# Patient Record
Sex: FEMALE | Race: BLACK OR AFRICAN AMERICAN | NOT HISPANIC OR LATINO | Employment: UNEMPLOYED | URBAN - METROPOLITAN AREA
[De-identification: names, ages, dates, MRNs, and addresses within clinical notes are randomized per-mention and may not be internally consistent; named-entity substitution may affect disease eponyms.]

---

## 2019-12-02 ENCOUNTER — OFFICE VISIT (OUTPATIENT)
Dept: OBSTETRICS AND GYNECOLOGY | Facility: CLINIC | Age: 59
End: 2019-12-02
Payer: OTHER GOVERNMENT

## 2019-12-02 ENCOUNTER — TELEPHONE (OUTPATIENT)
Dept: OBSTETRICS AND GYNECOLOGY | Facility: CLINIC | Age: 59
End: 2019-12-02

## 2019-12-02 VITALS — WEIGHT: 141.75 LBS | DIASTOLIC BLOOD PRESSURE: 68 MMHG | SYSTOLIC BLOOD PRESSURE: 108 MMHG

## 2019-12-02 DIAGNOSIS — Z01.419 ENCOUNTER FOR WELL WOMAN EXAM WITH ROUTINE GYNECOLOGICAL EXAM: Primary | ICD-10-CM

## 2019-12-02 PROCEDURE — 88175 CYTOPATH C/V AUTO FLUID REDO: CPT

## 2019-12-02 PROCEDURE — 99999 PR PBB SHADOW E&M-NEW PATIENT-LVL II: ICD-10-PCS | Mod: PBBFAC,,, | Performed by: OBSTETRICS & GYNECOLOGY

## 2019-12-02 PROCEDURE — 99999 PR PBB SHADOW E&M-NEW PATIENT-LVL II: CPT | Mod: PBBFAC,,, | Performed by: OBSTETRICS & GYNECOLOGY

## 2019-12-02 PROCEDURE — 99386 PREV VISIT NEW AGE 40-64: CPT | Mod: S$PBB,,, | Performed by: OBSTETRICS & GYNECOLOGY

## 2019-12-02 PROCEDURE — 87624 HPV HI-RISK TYP POOLED RSLT: CPT

## 2019-12-02 PROCEDURE — 99202 OFFICE O/P NEW SF 15 MIN: CPT | Mod: PBBFAC | Performed by: OBSTETRICS & GYNECOLOGY

## 2019-12-02 PROCEDURE — 99386 PR PREVENTIVE VISIT,NEW,40-64: ICD-10-PCS | Mod: S$PBB,,, | Performed by: OBSTETRICS & GYNECOLOGY
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
## 2019-12-02 NOTE — TELEPHONE ENCOUNTER
Attempted to contact patient , no answer. Left detailed message on patient voicemail to return call to the office.

## 2019-12-02 NOTE — TELEPHONE ENCOUNTER
----- Message from Shazia Khan sent at 12/2/2019 12:36 PM CST -----  Contact: Patient  Patient believe she will be more than 15 minutes late for her appointment. Please call to advise if she will be seen at Ph .[phone]

## 2019-12-06 LAB
HPV HR 12 DNA SPEC QL NAA+PROBE: NEGATIVE
HPV16 AG SPEC QL: NEGATIVE
HPV18 DNA SPEC QL NAA+PROBE: NEGATIVE

## 2019-12-15 LAB
FINAL PATHOLOGIC DIAGNOSIS: NORMAL
Lab: NORMAL

## 2019-12-17 ENCOUNTER — TELEPHONE (OUTPATIENT)
Dept: OBSTETRICS AND GYNECOLOGY | Facility: CLINIC | Age: 59
End: 2019-12-17

## 2019-12-17 NOTE — TELEPHONE ENCOUNTER
----- Message from Niyah Gallego MD sent at 12/16/2019  8:26 AM CST -----  Patient's pap smear is normal. She is not on mychart.  Please let her know.

## 2019-12-18 ENCOUNTER — TELEPHONE (OUTPATIENT)
Dept: ENDOSCOPY | Facility: HOSPITAL | Age: 59
End: 2019-12-18

## 2019-12-18 ENCOUNTER — TELEPHONE (OUTPATIENT)
Dept: OBSTETRICS AND GYNECOLOGY | Facility: CLINIC | Age: 59
End: 2019-12-18

## 2019-12-18 NOTE — TELEPHONE ENCOUNTER
----- Message from Eryn Valdes sent at 12/18/2019 11:44 AM CST -----  Contact: Pt   Pt is calling regarding requesting to have nurse call to back. Pt states that call is concerning  getting appt schedule . .[phone] (home)        .Thank You  Eryn Valdes

## 2019-12-18 NOTE — TELEPHONE ENCOUNTER
----- Message from Nadine Gudino sent at 12/18/2019 11:13 AM CST -----  Contact: [facility] includes the [facility]   Patient returning call.Please call back a [phone].    Thanks,  Nadine Gudino

## 2019-12-18 NOTE — TELEPHONE ENCOUNTER
Spoke to pt regarding her pap smear is normal and to follow up in 1 year. Pt. verbalized understanding.

## 2020-01-02 ENCOUNTER — TELEPHONE (OUTPATIENT)
Dept: OBSTETRICS AND GYNECOLOGY | Facility: CLINIC | Age: 60
End: 2020-01-02

## 2020-01-02 NOTE — TELEPHONE ENCOUNTER
Patient was calling to get an order put in to have a colonoscopy done, will forward message to Corinna to put in order.

## 2020-01-02 NOTE — TELEPHONE ENCOUNTER
----- Message from Yair Cornejo sent at 1/2/2020  8:14 AM CST -----  Contact: pt   Type:  Needs Medical Advice    Who Called: ROSALES MILES   Symptoms (please be specific):   How long has patient had these symptoms:   Pharmacy name and phone #:   Would the patient rather a call back or a response via [facility]?  Call   Best Call Back Number:  [phone]  Additional Information: pt is requesting a call back from the nurse in regards to a letter that the pt received from the office

## 2020-01-03 ENCOUNTER — TELEPHONE (OUTPATIENT)
Dept: OBSTETRICS AND GYNECOLOGY | Facility: CLINIC | Age: 60
End: 2020-01-03

## 2020-01-13 ENCOUNTER — TELEPHONE (OUTPATIENT)
Dept: OBSTETRICS AND GYNECOLOGY | Facility: CLINIC | Age: 60
End: 2020-01-13

## 2020-01-13 DIAGNOSIS — Z12.11 ENCOUNTER FOR SCREENING COLONOSCOPY: Primary | ICD-10-CM

## 2020-01-13 NOTE — TELEPHONE ENCOUNTER
I did not order it she is not my pt but was for Dr. Gallego, first I am seeing of this.    When was her last colonoscopy?

## 2020-01-13 NOTE — TELEPHONE ENCOUNTER
----- Message from Nadine Gudino sent at 1/10/2020 10:16 AM CST -----  Contact: Patient   Patient is waiting on when colonoscopy. Please call back after 1 pm at [phone]      Thanks,  Nadine Gudino

## 2020-01-14 NOTE — TELEPHONE ENCOUNTER
Patient had asked Dr. Gallego for order for colonoscopy.  Patient stated it has been over 5 years.  Will send to Corinna for advice.

## 2020-01-15 ENCOUNTER — TELEPHONE (OUTPATIENT)
Dept: ENDOSCOPY | Facility: HOSPITAL | Age: 60
End: 2020-01-15

## 2020-01-15 NOTE — TELEPHONE ENCOUNTER
Patient left message on [place] voicemail. Retrieved message and attempted to return patient's call. Left patient a message to call our office, number provided.

## 2020-01-17 ENCOUNTER — TELEPHONE (OUTPATIENT)
Dept: ENDOSCOPY | Facility: HOSPITAL | Age: 60
End: 2020-01-17

## 2020-01-22 ENCOUNTER — TELEPHONE (OUTPATIENT)
Dept: ENDOSCOPY | Facility: HOSPITAL | Age: 60
End: 2020-01-22

## 2020-07-28 DIAGNOSIS — Z12.11 SCREEN FOR COLON CANCER: Primary | ICD-10-CM

## 2020-07-28 RX ORDER — SODIUM, POTASSIUM,MAG SULFATES 17.5-3.13G
SOLUTION, RECONSTITUTED, ORAL ORAL
Qty: 354 ML | Refills: 0 | Status: SHIPPED | OUTPATIENT
Start: 2020-07-28